# Patient Record
Sex: FEMALE | Race: BLACK OR AFRICAN AMERICAN
[De-identification: names, ages, dates, MRNs, and addresses within clinical notes are randomized per-mention and may not be internally consistent; named-entity substitution may affect disease eponyms.]

---

## 2020-01-14 ENCOUNTER — HOSPITAL ENCOUNTER (OUTPATIENT)
Dept: HOSPITAL 62 - RAD | Age: 60
Discharge: HOME | End: 2020-01-14
Attending: PAIN MEDICINE
Payer: MEDICARE

## 2020-01-14 VITALS — SYSTOLIC BLOOD PRESSURE: 137 MMHG | DIASTOLIC BLOOD PRESSURE: 88 MMHG

## 2020-01-14 DIAGNOSIS — M17.11: Primary | ICD-10-CM

## 2020-01-14 DIAGNOSIS — M25.561: ICD-10-CM

## 2020-01-14 PROCEDURE — 99152 MOD SED SAME PHYS/QHP 5/>YRS: CPT

## 2020-01-14 PROCEDURE — 64640 INJECTION TREATMENT OF NERVE: CPT

## 2020-01-14 PROCEDURE — 99153 MOD SED SAME PHYS/QHP EA: CPT

## 2020-01-14 PROCEDURE — 64624 DSTRJ NULYT AGT GNCLR NRV: CPT

## 2020-01-14 NOTE — OPERATIVE REPORT
KNEE RADIOFREQUENCY -RIGHT


 


PROCEDURE:





1. Superolateral genicular branch from the vastus lateralis


2. Superomedial genicular branch from the vastus medialis 


3. Inferomedial genicular branch from the saphenous nerve 


4. Medial retinacular branch from the vastus intermedius 


 


DATE OF PROCEDURE: January 14, 2020


ANESTHESIA: Local with IV sedation using midazolam and fentanyl


COMPLICATIONS: None


 


PROCEDURE IN DETAIL: Hx/PE/meds/allergies/applicable labs reviewed. No changes 

and no contraindications were found. Full description of the procedure was 

provided including benefits as well as possible complications including 

transient increased pain, stomach irritation, mood alteration, transient 

weakness or parasthesias as well as more serious nerve injury, bleeding, 

infection or allergic reaction. Informed consent was obtained and documented.


 


The patient was brought to the procedure room and placed on the exam table in a 

comfortable supine position. The place for needle placement was obtained by 

manual palpation with radiographic confirmation. The sterile field was prepared 

by chloroprep and sterile drapes. Local anesthesia superficial and deep was 

provided by local infiltration of 1% lidocaine.


 


A 17g 100mm radiofrequency introducer needle with a 4 mm active tip was placed 

overlying the Right knee joint and using fluoroscopic guidance the needle was 

advanced to a bony endpoint on the superiolateral portion of the femoral condyle

of the Right knee. A second needle was advanced to a bony endpoint on the 

superiomedial portion of the femoral condyle. A third needle was then placed 

over the inferiomedial portion of the tibial condyle until a bony endpoint was 

met. A fourth needle was placed midline of the femur approximately 2cm superior 

to the upper border of the patella. Attempted aspiration yielded no blood. 

Lateral x-ray views showed all the needles at 50% depth of the femur and tibia. 

Motor stimulation was tested at 2.0 volts with no leg movement. Images were 

saved in AP and lateral. A mixture consisting of 2% lidocaine was slowly 

injected. Then a radiofrequency ablation of each of the geniculate nerves were 

done at 80 degrees Celsius for 2 minutes and 30 seconds each.  Following 

ablation each site was infiltrated with 1 mL of a solution containing 0.25% 

bupivacaine with 40 mg Depo-Medrol.  The needles were withdrawn. The patient 

tolerated the procedure well. After observation the patient was discharged with 

instructions and follow up. They were also provided contact information to call 

regarding any concerning symptoms or questions.


 


IMPRESSION: 


 


1. Successful geniculate knee radiofrequency ablation was performed.


2. The patient was given prescription of tizanidine for postprocedural muscle 

spasm.


3. RTC in 2 week(s).